# Patient Record
Sex: MALE | Race: OTHER | HISPANIC OR LATINO | Employment: FULL TIME | URBAN - METROPOLITAN AREA
[De-identification: names, ages, dates, MRNs, and addresses within clinical notes are randomized per-mention and may not be internally consistent; named-entity substitution may affect disease eponyms.]

---

## 2023-05-05 NOTE — PROGRESS NOTES
Subjective:       Patient ID: Javier Egan is a 37 y.o. male.    Chief Complaint: Establish Care      HPI   This is a 37-year-old  male who presents to clinic today to establish care.  Patient has no significant past medical history and takes no daily medications.  Patient works in the oil field industry, he is a .  He has no complaints today.  Review of Systems  Comprehensive review of systems negative except as stated in HPI    The patient's Health Maintenance was reviewed and the following appears to be due:   Health Maintenance Due   Topic Date Due    Hepatitis C Screening  Never done    Lipid Panel  Never done    HIV Screening  Never done    TETANUS VACCINE  Never done    Hemoglobin A1c (Diabetic Prevention Screening)  Never done       Past Medical History:  History reviewed. No pertinent past medical history.  History reviewed. No pertinent surgical history.  Review of patient's allergies indicates:  No Known Allergies  Current Outpatient Medications on File Prior to Visit   Medication Sig Dispense Refill    ascorbic acid, vitamin C, (VITAMIN C) 1000 MG tablet Take 1,000 mg by mouth once daily.       No current facility-administered medications on file prior to visit.     Social History     Socioeconomic History    Marital status: Single   Tobacco Use    Smoking status: Never     Passive exposure: Never    Smokeless tobacco: Never   Substance and Sexual Activity    Alcohol use: Yes     Comment: socially    Drug use: Never    Sexual activity: Not Currently     Social Determinants of Health     Financial Resource Strain: Low Risk     Difficulty of Paying Living Expenses: Not hard at all   Food Insecurity: Unknown    Worried About Running Out of Food in the Last Year: Patient refused    Ran Out of Food in the Last Year: Patient refused   Transportation Needs: Unknown    Lack of Transportation (Medical): Patient refused    Lack of Transportation (Non-Medical): Patient refused   Physical  "Activity: Inactive    Days of Exercise per Week: 0 days    Minutes of Exercise per Session: 0 min   Stress: Unknown    Feeling of Stress : Patient refused   Social Connections: Unknown    Frequency of Communication with Friends and Family: Patient refused    Frequency of Social Gatherings with Friends and Family: Patient refused    Attends Taoist Services: Patient refused    Active Member of Clubs or Organizations: No    Attends Club or Organization Meetings: Never    Marital Status: Never    Housing Stability: Low Risk     Unable to Pay for Housing in the Last Year: No    Number of Places Lived in the Last Year: 1    Unstable Housing in the Last Year: No     Family History   Family history unknown: Yes       Objective:       /72 (BP Location: Right arm)   Pulse 94   Temp 98.1 °F (36.7 °C) (Oral)   Resp 16   Ht 5' 8" (1.727 m)   Wt 102.2 kg (225 lb 3.2 oz)   SpO2 97%   BMI 34.24 kg/m²      Physical Exam  Vitals and nursing note reviewed.   Constitutional:       Appearance: Normal appearance. He is obese.   HENT:      Head: Normocephalic and atraumatic.      Right Ear: Tympanic membrane, ear canal and external ear normal.      Left Ear: Tympanic membrane, ear canal and external ear normal.      Nose: Nose normal.      Mouth/Throat:      Mouth: Mucous membranes are moist.      Pharynx: Oropharynx is clear.   Eyes:      Extraocular Movements: Extraocular movements intact.      Conjunctiva/sclera: Conjunctivae normal.      Pupils: Pupils are equal, round, and reactive to light.   Cardiovascular:      Rate and Rhythm: Normal rate and regular rhythm.      Pulses: Normal pulses.      Heart sounds: Normal heart sounds.   Pulmonary:      Effort: Pulmonary effort is normal.      Breath sounds: Normal breath sounds.   Musculoskeletal:         General: Normal range of motion.      Cervical back: Normal range of motion and neck supple.   Skin:     General: Skin is warm and dry.   Neurological:      " General: No focal deficit present.      Mental Status: He is alert and oriented to person, place, and time.   Psychiatric:         Mood and Affect: Mood normal.         Behavior: Behavior normal.         Thought Content: Thought content normal.         Judgment: Judgment normal.       Labs  No results found for any previous visit.       Assessment and Plan       ICD-10-CM ICD-9-CM   1. Encounter to establish care  Z76.89 V65.8   2. Annual physical exam  Z00.00 V70.0   3. Need for hepatitis C screening test  Z11.59 V73.89   4. Screening for HIV (human immunodeficiency virus)  Z11.4 V73.89        1. Encounter to establish care    2. Annual physical exam  -     CBC Auto Differential; Future; Expected date: 05/08/2023  -     Comprehensive Metabolic Panel; Future; Expected date: 05/08/2023  -     Lipid Panel; Future; Expected date: 05/08/2023  -     TSH; Future; Expected date: 05/08/2023  -     Hemoglobin A1C; Future; Expected date: 05/08/2023    3. Need for hepatitis C screening test  -     Hepatitis C Antibody; Future; Expected date: 05/08/2023    4. Screening for HIV (human immunodeficiency virus)  -     HIV 1/2 Ag/Ab (4th Gen); Future; Expected date: 05/08/2023       Patient will return to clinic in 2 weeks with labs prior for wellness exam.  Discussed administering Boostrix at that exam.  Patient will think about it.    Follow up in about 2 weeks (around 5/22/2023) for Annual.

## 2023-05-08 ENCOUNTER — OFFICE VISIT (OUTPATIENT)
Dept: FAMILY MEDICINE | Facility: CLINIC | Age: 38
End: 2023-05-08
Payer: COMMERCIAL

## 2023-05-08 VITALS
SYSTOLIC BLOOD PRESSURE: 118 MMHG | BODY MASS INDEX: 34.13 KG/M2 | WEIGHT: 225.19 LBS | HEART RATE: 94 BPM | RESPIRATION RATE: 16 BRPM | OXYGEN SATURATION: 97 % | TEMPERATURE: 98 F | DIASTOLIC BLOOD PRESSURE: 72 MMHG | HEIGHT: 68 IN

## 2023-05-08 DIAGNOSIS — Z76.89 ENCOUNTER TO ESTABLISH CARE: Primary | ICD-10-CM

## 2023-05-08 DIAGNOSIS — Z00.00 ANNUAL PHYSICAL EXAM: ICD-10-CM

## 2023-05-08 DIAGNOSIS — Z11.4 SCREENING FOR HIV (HUMAN IMMUNODEFICIENCY VIRUS): ICD-10-CM

## 2023-05-08 DIAGNOSIS — Z11.59 NEED FOR HEPATITIS C SCREENING TEST: ICD-10-CM

## 2023-05-08 PROCEDURE — 3008F BODY MASS INDEX DOCD: CPT | Mod: CPTII,,, | Performed by: NURSE PRACTITIONER

## 2023-05-08 PROCEDURE — 3078F PR MOST RECENT DIASTOLIC BLOOD PRESSURE < 80 MM HG: ICD-10-PCS | Mod: CPTII,,, | Performed by: NURSE PRACTITIONER

## 2023-05-08 PROCEDURE — 1160F PR REVIEW ALL MEDS BY PRESCRIBER/CLIN PHARMACIST DOCUMENTED: ICD-10-PCS | Mod: CPTII,,, | Performed by: NURSE PRACTITIONER

## 2023-05-08 PROCEDURE — 99395 PREV VISIT EST AGE 18-39: CPT | Mod: ,,, | Performed by: NURSE PRACTITIONER

## 2023-05-08 PROCEDURE — 3078F DIAST BP <80 MM HG: CPT | Mod: CPTII,,, | Performed by: NURSE PRACTITIONER

## 2023-05-08 PROCEDURE — 99395 PR PREVENTIVE VISIT,EST,18-39: ICD-10-PCS | Mod: ,,, | Performed by: NURSE PRACTITIONER

## 2023-05-08 PROCEDURE — 1160F RVW MEDS BY RX/DR IN RCRD: CPT | Mod: CPTII,,, | Performed by: NURSE PRACTITIONER

## 2023-05-08 PROCEDURE — 3074F SYST BP LT 130 MM HG: CPT | Mod: CPTII,,, | Performed by: NURSE PRACTITIONER

## 2023-05-08 PROCEDURE — 1159F PR MEDICATION LIST DOCUMENTED IN MEDICAL RECORD: ICD-10-PCS | Mod: CPTII,,, | Performed by: NURSE PRACTITIONER

## 2023-05-08 PROCEDURE — 1159F MED LIST DOCD IN RCRD: CPT | Mod: CPTII,,, | Performed by: NURSE PRACTITIONER

## 2023-05-08 PROCEDURE — 3008F PR BODY MASS INDEX (BMI) DOCUMENTED: ICD-10-PCS | Mod: CPTII,,, | Performed by: NURSE PRACTITIONER

## 2023-05-08 PROCEDURE — 3074F PR MOST RECENT SYSTOLIC BLOOD PRESSURE < 130 MM HG: ICD-10-PCS | Mod: CPTII,,, | Performed by: NURSE PRACTITIONER

## 2023-05-08 RX ORDER — IBUPROFEN 100 MG/5ML
1000 SUSPENSION, ORAL (FINAL DOSE FORM) ORAL DAILY
COMMUNITY

## 2023-05-17 ENCOUNTER — TELEPHONE (OUTPATIENT)
Dept: FAMILY MEDICINE | Facility: CLINIC | Age: 38
End: 2023-05-17
Payer: COMMERCIAL

## 2023-05-17 NOTE — TELEPHONE ENCOUNTER
No answer/ no voicemail on 5/17/23 at 3:20 when attempted to contact patient for visit reminder. Labs needed prior to visit.

## 2023-05-20 ENCOUNTER — LAB VISIT (OUTPATIENT)
Dept: LAB | Facility: HOSPITAL | Age: 38
End: 2023-05-20
Attending: NURSE PRACTITIONER
Payer: COMMERCIAL

## 2023-05-20 DIAGNOSIS — Z11.4 SCREENING FOR HIV (HUMAN IMMUNODEFICIENCY VIRUS): ICD-10-CM

## 2023-05-20 DIAGNOSIS — Z00.00 ANNUAL PHYSICAL EXAM: ICD-10-CM

## 2023-05-20 DIAGNOSIS — Z11.59 NEED FOR HEPATITIS C SCREENING TEST: ICD-10-CM

## 2023-05-20 LAB
ALBUMIN SERPL-MCNC: 4.3 G/DL (ref 3.5–5)
ALBUMIN/GLOB SERPL: 1.3 RATIO (ref 1.1–2)
ALP SERPL-CCNC: 109 UNIT/L (ref 40–150)
ALT SERPL-CCNC: 36 UNIT/L (ref 0–55)
AST SERPL-CCNC: 21 UNIT/L (ref 5–34)
BASOPHILS # BLD AUTO: 0.02 X10(3)/MCL
BASOPHILS NFR BLD AUTO: 0.4 %
BILIRUBIN DIRECT+TOT PNL SERPL-MCNC: 0.6 MG/DL
BUN SERPL-MCNC: 14 MG/DL (ref 8.9–20.6)
CALCIUM SERPL-MCNC: 8.8 MG/DL (ref 8.4–10.2)
CHLORIDE SERPL-SCNC: 107 MMOL/L (ref 98–107)
CHOLEST SERPL-MCNC: 146 MG/DL
CHOLEST/HDLC SERPL: 7 {RATIO} (ref 0–5)
CO2 SERPL-SCNC: 28 MMOL/L (ref 22–29)
CREAT SERPL-MCNC: 0.74 MG/DL (ref 0.73–1.18)
EOSINOPHIL # BLD AUTO: 0.11 X10(3)/MCL (ref 0–0.9)
EOSINOPHIL NFR BLD AUTO: 2.4 %
ERYTHROCYTE [DISTWIDTH] IN BLOOD BY AUTOMATED COUNT: 14 % (ref 11.5–17)
EST. AVERAGE GLUCOSE BLD GHB EST-MCNC: 105.4 MG/DL
GFR SERPLBLD CREATININE-BSD FMLA CKD-EPI: >60 MLS/MIN/1.73/M2
GLOBULIN SER-MCNC: 3.4 GM/DL (ref 2.4–3.5)
GLUCOSE SERPL-MCNC: 94 MG/DL (ref 74–100)
HBA1C MFR BLD: 5.3 %
HCT VFR BLD AUTO: 50.7 % (ref 42–52)
HCV AB SERPL QL IA: NONREACTIVE
HDLC SERPL-MCNC: 22 MG/DL (ref 35–60)
HGB BLD-MCNC: 15.8 G/DL (ref 14–18)
HIV 1+2 AB+HIV1 P24 AG SERPL QL IA: NONREACTIVE
IMM GRANULOCYTES # BLD AUTO: 0.01 X10(3)/MCL (ref 0–0.04)
IMM GRANULOCYTES NFR BLD AUTO: 0.2 %
LDLC SERPL CALC-MCNC: 58 MG/DL (ref 50–140)
LYMPHOCYTES # BLD AUTO: 1.96 X10(3)/MCL (ref 0.6–4.6)
LYMPHOCYTES NFR BLD AUTO: 42.6 %
MCH RBC QN AUTO: 26.3 PG (ref 27–31)
MCHC RBC AUTO-ENTMCNC: 31.2 G/DL (ref 33–36)
MCV RBC AUTO: 84.5 FL (ref 80–94)
MONOCYTES # BLD AUTO: 0.36 X10(3)/MCL (ref 0.1–1.3)
MONOCYTES NFR BLD AUTO: 7.8 %
NEUTROPHILS # BLD AUTO: 2.14 X10(3)/MCL (ref 2.1–9.2)
NEUTROPHILS NFR BLD AUTO: 46.6 %
PLATELET # BLD AUTO: 150 X10(3)/MCL (ref 130–400)
PMV BLD AUTO: 11.3 FL (ref 7.4–10.4)
POTASSIUM SERPL-SCNC: 3.9 MMOL/L (ref 3.5–5.1)
PROT SERPL-MCNC: 7.7 GM/DL (ref 6.4–8.3)
RBC # BLD AUTO: 6 X10(6)/MCL (ref 4.7–6.1)
SODIUM SERPL-SCNC: 144 MMOL/L (ref 136–145)
TRIGL SERPL-MCNC: 330 MG/DL (ref 34–140)
TSH SERPL-ACNC: 2.76 UIU/ML (ref 0.35–4.94)
VLDLC SERPL CALC-MCNC: 66 MG/DL
WBC # SPEC AUTO: 4.6 X10(3)/MCL (ref 4.5–11.5)

## 2023-05-20 PROCEDURE — 80061 LIPID PANEL: CPT

## 2023-05-20 PROCEDURE — 84443 ASSAY THYROID STIM HORMONE: CPT

## 2023-05-20 PROCEDURE — 36415 COLL VENOUS BLD VENIPUNCTURE: CPT

## 2023-05-20 PROCEDURE — 80053 COMPREHEN METABOLIC PANEL: CPT

## 2023-05-20 PROCEDURE — 83036 HEMOGLOBIN GLYCOSYLATED A1C: CPT

## 2023-05-20 PROCEDURE — 85025 COMPLETE CBC W/AUTO DIFF WBC: CPT

## 2023-05-20 PROCEDURE — 87389 HIV-1 AG W/HIV-1&-2 AB AG IA: CPT

## 2023-05-20 PROCEDURE — 86803 HEPATITIS C AB TEST: CPT

## 2023-05-24 ENCOUNTER — OFFICE VISIT (OUTPATIENT)
Dept: FAMILY MEDICINE | Facility: CLINIC | Age: 38
End: 2023-05-24
Payer: COMMERCIAL

## 2023-05-24 VITALS
OXYGEN SATURATION: 96 % | RESPIRATION RATE: 16 BRPM | SYSTOLIC BLOOD PRESSURE: 136 MMHG | BODY MASS INDEX: 34.5 KG/M2 | HEART RATE: 109 BPM | DIASTOLIC BLOOD PRESSURE: 84 MMHG | WEIGHT: 227.63 LBS | HEIGHT: 68 IN | TEMPERATURE: 99 F

## 2023-05-24 DIAGNOSIS — Z11.4 SCREENING FOR HIV (HUMAN IMMUNODEFICIENCY VIRUS): ICD-10-CM

## 2023-05-24 DIAGNOSIS — Z23 NEED FOR TDAP VACCINATION: ICD-10-CM

## 2023-05-24 DIAGNOSIS — Z23 NEED FOR INFLUENZA VACCINATION: ICD-10-CM

## 2023-05-24 DIAGNOSIS — Z11.59 NEED FOR HEPATITIS C SCREENING TEST: ICD-10-CM

## 2023-05-24 DIAGNOSIS — E78.1 HYPERTRIGLYCERIDEMIA: ICD-10-CM

## 2023-05-24 DIAGNOSIS — Z00.00 ANNUAL PHYSICAL EXAM: Primary | ICD-10-CM

## 2023-05-24 PROCEDURE — 1159F MED LIST DOCD IN RCRD: CPT | Mod: CPTII,,, | Performed by: NURSE PRACTITIONER

## 2023-05-24 PROCEDURE — 3079F PR MOST RECENT DIASTOLIC BLOOD PRESSURE 80-89 MM HG: ICD-10-PCS | Mod: CPTII,,, | Performed by: NURSE PRACTITIONER

## 2023-05-24 PROCEDURE — 90472 TDAP VACCINE GREATER THAN OR EQUAL TO 7YO IM: ICD-10-PCS | Mod: ,,, | Performed by: NURSE PRACTITIONER

## 2023-05-24 PROCEDURE — 1159F PR MEDICATION LIST DOCUMENTED IN MEDICAL RECORD: ICD-10-PCS | Mod: CPTII,,, | Performed by: NURSE PRACTITIONER

## 2023-05-24 PROCEDURE — 99395 PR PREVENTIVE VISIT,EST,18-39: ICD-10-PCS | Mod: 25,,, | Performed by: NURSE PRACTITIONER

## 2023-05-24 PROCEDURE — 3075F PR MOST RECENT SYSTOLIC BLOOD PRESS GE 130-139MM HG: ICD-10-PCS | Mod: CPTII,,, | Performed by: NURSE PRACTITIONER

## 2023-05-24 PROCEDURE — 3008F PR BODY MASS INDEX (BMI) DOCUMENTED: ICD-10-PCS | Mod: CPTII,,, | Performed by: NURSE PRACTITIONER

## 2023-05-24 PROCEDURE — 90471 IMMUNIZATION ADMIN: CPT | Mod: ,,, | Performed by: NURSE PRACTITIONER

## 2023-05-24 PROCEDURE — 90686 IIV4 VACC NO PRSV 0.5 ML IM: CPT | Mod: ,,, | Performed by: NURSE PRACTITIONER

## 2023-05-24 PROCEDURE — 1160F RVW MEDS BY RX/DR IN RCRD: CPT | Mod: CPTII,,, | Performed by: NURSE PRACTITIONER

## 2023-05-24 PROCEDURE — 3008F BODY MASS INDEX DOCD: CPT | Mod: CPTII,,, | Performed by: NURSE PRACTITIONER

## 2023-05-24 PROCEDURE — 90472 IMMUNIZATION ADMIN EACH ADD: CPT | Mod: ,,, | Performed by: NURSE PRACTITIONER

## 2023-05-24 PROCEDURE — 90471 FLU VACCINE (QUAD) GREATER THAN OR EQUAL TO 3YO PRESERVATIVE FREE IM: ICD-10-PCS | Mod: ,,, | Performed by: NURSE PRACTITIONER

## 2023-05-24 PROCEDURE — 99395 PREV VISIT EST AGE 18-39: CPT | Mod: 25,,, | Performed by: NURSE PRACTITIONER

## 2023-05-24 PROCEDURE — 90715 TDAP VACCINE GREATER THAN OR EQUAL TO 7YO IM: ICD-10-PCS | Mod: ,,, | Performed by: NURSE PRACTITIONER

## 2023-05-24 PROCEDURE — 3079F DIAST BP 80-89 MM HG: CPT | Mod: CPTII,,, | Performed by: NURSE PRACTITIONER

## 2023-05-24 PROCEDURE — 90715 TDAP VACCINE 7 YRS/> IM: CPT | Mod: ,,, | Performed by: NURSE PRACTITIONER

## 2023-05-24 PROCEDURE — 3075F SYST BP GE 130 - 139MM HG: CPT | Mod: CPTII,,, | Performed by: NURSE PRACTITIONER

## 2023-05-24 PROCEDURE — 90686 FLU VACCINE (QUAD) GREATER THAN OR EQUAL TO 3YO PRESERVATIVE FREE IM: ICD-10-PCS | Mod: ,,, | Performed by: NURSE PRACTITIONER

## 2023-05-24 PROCEDURE — 1160F PR REVIEW ALL MEDS BY PRESCRIBER/CLIN PHARMACIST DOCUMENTED: ICD-10-PCS | Mod: CPTII,,, | Performed by: NURSE PRACTITIONER

## 2023-05-24 NOTE — ASSESSMENT & PLAN NOTE
Total cholesterol 146, HDL 22, triglycerides 330, LDL 58.  Total cholesterol to HDL ratio 7.  Discussed with patient elevated triglyceride levels and low HDL level.  Discussed need for low-cholesterol diet, increased exercise.  Will recheck in 1 year.

## 2023-05-24 NOTE — PROGRESS NOTES
Subjective:       Patient ID: Javier Egan is a 37 y.o. male.    Chief Complaint: Annual Exam      HPI   This is a 37-year-old  male who presents to clinic today for an annual wellness exam.  Patient has no significant past medical history and takes no daily medications.  He has no complaints today.  Review of Systems  Comprehensive review of systems negative except as stated in HPI    The patient's Health Maintenance was reviewed and the following appears to be due:   Health Maintenance Due   Topic Date Due    TETANUS VACCINE  Never done       Past Medical History:  History reviewed. No pertinent past medical history.  History reviewed. No pertinent surgical history.  Review of patient's allergies indicates:  No Known Allergies  Current Outpatient Medications on File Prior to Visit   Medication Sig Dispense Refill    ascorbic acid, vitamin C, (VITAMIN C) 1000 MG tablet Take 1,000 mg by mouth once daily.       No current facility-administered medications on file prior to visit.     Social History     Socioeconomic History    Marital status: Single   Tobacco Use    Smoking status: Never     Passive exposure: Never    Smokeless tobacco: Never   Substance and Sexual Activity    Alcohol use: Yes     Comment: socially    Drug use: Never    Sexual activity: Not Currently     Social Determinants of Health     Financial Resource Strain: Low Risk     Difficulty of Paying Living Expenses: Not hard at all   Food Insecurity: Unknown    Worried About Running Out of Food in the Last Year: Patient refused    Ran Out of Food in the Last Year: Patient refused   Transportation Needs: Unknown    Lack of Transportation (Medical): Patient refused    Lack of Transportation (Non-Medical): Patient refused   Physical Activity: Inactive    Days of Exercise per Week: 0 days    Minutes of Exercise per Session: 0 min   Stress: Unknown    Feeling of Stress : Patient refused   Social Connections: Unknown    Frequency of Communication  "with Friends and Family: Patient refused    Frequency of Social Gatherings with Friends and Family: Patient refused    Attends Yazidi Services: Patient refused    Active Member of Clubs or Organizations: No    Attends Club or Organization Meetings: Never    Marital Status: Never    Housing Stability: Low Risk     Unable to Pay for Housing in the Last Year: No    Number of Places Lived in the Last Year: 1    Unstable Housing in the Last Year: No     Family History   Family history unknown: Yes       Objective:       /84 (BP Location: Left arm)   Pulse 109   Temp 98.7 °F (37.1 °C) (Oral)   Resp 16   Ht 5' 8" (1.727 m)   Wt 103.2 kg (227 lb 9.6 oz)   SpO2 96%   BMI 34.61 kg/m²      Physical Exam  Vitals and nursing note reviewed.   Constitutional:       Appearance: Normal appearance. He is normal weight.   HENT:      Head: Normocephalic and atraumatic.      Right Ear: Tympanic membrane, ear canal and external ear normal.      Left Ear: Tympanic membrane, ear canal and external ear normal.      Nose: Nose normal.      Mouth/Throat:      Mouth: Mucous membranes are moist.      Pharynx: Oropharynx is clear.   Eyes:      Extraocular Movements: Extraocular movements intact.      Conjunctiva/sclera: Conjunctivae normal.      Pupils: Pupils are equal, round, and reactive to light.   Cardiovascular:      Rate and Rhythm: Normal rate and regular rhythm.      Pulses: Normal pulses.      Heart sounds: Normal heart sounds.   Pulmonary:      Effort: Pulmonary effort is normal.      Breath sounds: Normal breath sounds.   Abdominal:      General: Abdomen is flat. Bowel sounds are normal.      Palpations: Abdomen is soft.   Musculoskeletal:         General: Normal range of motion.      Cervical back: Normal range of motion and neck supple.   Skin:     General: Skin is warm and dry.   Neurological:      General: No focal deficit present.      Mental Status: He is alert and oriented to person, place, and time. "   Psychiatric:         Mood and Affect: Mood normal.         Behavior: Behavior normal.         Thought Content: Thought content normal.         Judgment: Judgment normal.       Labs  Lab Visit on 05/20/2023   Component Date Value Ref Range Status    Sodium Level 05/20/2023 144  136 - 145 mmol/L Final    Potassium Level 05/20/2023 3.9  3.5 - 5.1 mmol/L Final    Chloride 05/20/2023 107  98 - 107 mmol/L Final    Carbon Dioxide 05/20/2023 28  22 - 29 mmol/L Final    Glucose Level 05/20/2023 94  74 - 100 mg/dL Final    Blood Urea Nitrogen 05/20/2023 14.0  8.9 - 20.6 mg/dL Final    Creatinine 05/20/2023 0.74  0.73 - 1.18 mg/dL Final    Calcium Level Total 05/20/2023 8.8  8.4 - 10.2 mg/dL Final    Protein Total 05/20/2023 7.7  6.4 - 8.3 gm/dL Final    Albumin Level 05/20/2023 4.3  3.5 - 5.0 g/dL Final    Globulin 05/20/2023 3.4  2.4 - 3.5 gm/dL Final    Albumin/Globulin Ratio 05/20/2023 1.3  1.1 - 2.0 ratio Final    Bilirubin Total 05/20/2023 0.6  <=1.5 mg/dL Final    Alkaline Phosphatase 05/20/2023 109  40 - 150 unit/L Final    Alanine Aminotransferase 05/20/2023 36  0 - 55 unit/L Final    Aspartate Aminotransferase 05/20/2023 21  5 - 34 unit/L Final    eGFR 05/20/2023 >60  mls/min/1.73/m2 Final    Cholesterol Total 05/20/2023 146  <=200 mg/dL Final    HDL Cholesterol 05/20/2023 22 (L)  35 - 60 mg/dL Final    Triglyceride 05/20/2023 330 (H)  34 - 140 mg/dL Final    Cholesterol/HDL Ratio 05/20/2023 7 (H)  0 - 5 Final    Very Low Density Lipoprotein 05/20/2023 66   Final    LDL Cholesterol 05/20/2023 58.00  50.00 - 140.00 mg/dL Final    Thyroid Stimulating Hormone 05/20/2023 2.761  0.350 - 4.940 uIU/mL Final    Hemoglobin A1c 05/20/2023 5.3  <=7.0 % Final    Estimated Average Glucose 05/20/2023 105.4  mg/dL Final    Hepatitis C Antibody 05/20/2023 Nonreactive  Nonreactive Final    HIV 05/20/2023 Nonreactive  Nonreactive Final    WBC 05/20/2023 4.60  4.50 - 11.50 x10(3)/mcL Final    RBC 05/20/2023 6.00  4.70 - 6.10  x10(6)/mcL Final    Hgb 05/20/2023 15.8  14.0 - 18.0 g/dL Final    Hct 05/20/2023 50.7  42.0 - 52.0 % Final    MCV 05/20/2023 84.5  80.0 - 94.0 fL Final    MCH 05/20/2023 26.3 (L)  27.0 - 31.0 pg Final    MCHC 05/20/2023 31.2 (L)  33.0 - 36.0 g/dL Final    RDW 05/20/2023 14.0  11.5 - 17.0 % Final    Platelet 05/20/2023 150  130 - 400 x10(3)/mcL Final    MPV 05/20/2023 11.3 (H)  7.4 - 10.4 fL Final    Neut % 05/20/2023 46.6  % Final    Lymph % 05/20/2023 42.6  % Final    Mono % 05/20/2023 7.8  % Final    Eos % 05/20/2023 2.4  % Final    Basophil % 05/20/2023 0.4  % Final    Lymph # 05/20/2023 1.96  0.6 - 4.6 x10(3)/mcL Final    Neut # 05/20/2023 2.14  2.1 - 9.2 x10(3)/mcL Final    Mono # 05/20/2023 0.36  0.1 - 1.3 x10(3)/mcL Final    Eos # 05/20/2023 0.11  0 - 0.9 x10(3)/mcL Final    Baso # 05/20/2023 0.02  <=0.2 x10(3)/mcL Final    IG# 05/20/2023 0.01  0 - 0.04 x10(3)/mcL Final    IG% 05/20/2023 0.2  % Final       Assessment and Plan       ICD-10-CM ICD-9-CM   1. Annual physical exam  Z00.00 V70.0   2. Hypertriglyceridemia  E78.1 272.1   3. Screening for HIV (human immunodeficiency virus)  Z11.4 V73.89   4. Need for hepatitis C screening test  Z11.59 V73.89   5. Need for Tdap vaccination  Z23 V06.1   6. Need for influenza vaccination  Z23 V04.81        1. Annual physical exam  Overview:  Annual exam yearly in May    Orders:  -     CBC Auto Differential; Future; Expected date: 05/24/2024  -     Comprehensive Metabolic Panel; Future; Expected date: 05/24/2024  -     Lipid Panel; Future; Expected date: 05/24/2024  -     TSH; Future; Expected date: 05/24/2024  -     Hemoglobin A1C; Future; Expected date: 05/24/2024    2. Hypertriglyceridemia  Assessment & Plan:  Total cholesterol 146, HDL 22, triglycerides 330, LDL 58.  Total cholesterol to HDL ratio 7.  Discussed with patient elevated triglyceride levels and low HDL level.  Discussed need for low-cholesterol diet, increased exercise.  Will recheck in 1 year.      3.  Screening for HIV (human immunodeficiency virus)  Comments:  Nonreactive    4. Need for hepatitis C screening test  Comments:  Nonreactive    5. Need for Tdap vaccination  -     (In Office Administered) Tdap Vaccine    6. Need for influenza vaccination  -     Influenza - Quadrivalent *Preferred* (6 months+) (PF)           Follow up in about 1 year (around 5/24/2024) for Annual.

## 2023-08-28 PROBLEM — Z00.00 ANNUAL PHYSICAL EXAM: Status: RESOLVED | Noted: 2023-05-08 | Resolved: 2023-08-28

## 2024-05-21 ENCOUNTER — TELEPHONE (OUTPATIENT)
Dept: FAMILY MEDICINE | Facility: CLINIC | Age: 39
End: 2024-05-21
Payer: COMMERCIAL

## 2024-05-21 NOTE — TELEPHONE ENCOUNTER
Attempted to contact patient for previsit on 5/21/24 at 9:35. Left message reminding patient about his upcoming visit with labs needed prior to appointment.

## 2024-05-25 ENCOUNTER — LAB VISIT (OUTPATIENT)
Dept: LAB | Facility: HOSPITAL | Age: 39
End: 2024-05-25
Attending: NURSE PRACTITIONER
Payer: COMMERCIAL

## 2024-05-25 DIAGNOSIS — Z00.00 ANNUAL PHYSICAL EXAM: ICD-10-CM

## 2024-05-25 LAB
ALBUMIN SERPL-MCNC: 4.1 G/DL (ref 3.5–5)
ALBUMIN/GLOB SERPL: 1.2 RATIO (ref 1.1–2)
ALP SERPL-CCNC: 117 UNIT/L (ref 40–150)
ALT SERPL-CCNC: 25 UNIT/L (ref 0–55)
ANION GAP SERPL CALC-SCNC: 8 MEQ/L
AST SERPL-CCNC: 21 UNIT/L (ref 5–34)
BASOPHILS # BLD AUTO: 0.03 X10(3)/MCL
BASOPHILS NFR BLD AUTO: 0.6 %
BILIRUB SERPL-MCNC: 0.5 MG/DL
BUN SERPL-MCNC: 11 MG/DL (ref 8.9–20.6)
CALCIUM SERPL-MCNC: 8.8 MG/DL (ref 8.4–10.2)
CHLORIDE SERPL-SCNC: 111 MMOL/L (ref 98–107)
CHOLEST SERPL-MCNC: 193 MG/DL
CHOLEST/HDLC SERPL: 10 {RATIO} (ref 0–5)
CO2 SERPL-SCNC: 25 MMOL/L (ref 22–29)
CREAT SERPL-MCNC: 0.85 MG/DL (ref 0.73–1.18)
CREAT/UREA NIT SERPL: 13
EOSINOPHIL # BLD AUTO: 0.14 X10(3)/MCL (ref 0–0.9)
EOSINOPHIL NFR BLD AUTO: 2.6 %
ERYTHROCYTE [DISTWIDTH] IN BLOOD BY AUTOMATED COUNT: 14.2 % (ref 11.5–17)
EST. AVERAGE GLUCOSE BLD GHB EST-MCNC: 108.3 MG/DL
GFR SERPLBLD CREATININE-BSD FMLA CKD-EPI: >60 ML/MIN/1.73/M2
GLOBULIN SER-MCNC: 3.4 GM/DL (ref 2.4–3.5)
GLUCOSE SERPL-MCNC: 101 MG/DL (ref 74–100)
HBA1C MFR BLD: 5.4 %
HCT VFR BLD AUTO: 50.3 % (ref 42–52)
HDLC SERPL-MCNC: 20 MG/DL (ref 35–60)
HGB BLD-MCNC: 16 G/DL (ref 14–18)
IMM GRANULOCYTES # BLD AUTO: 0.01 X10(3)/MCL (ref 0–0.04)
IMM GRANULOCYTES NFR BLD AUTO: 0.2 %
LYMPHOCYTES # BLD AUTO: 2.37 X10(3)/MCL (ref 0.6–4.6)
LYMPHOCYTES NFR BLD AUTO: 43.9 %
MCH RBC QN AUTO: 26.5 PG (ref 27–31)
MCHC RBC AUTO-ENTMCNC: 31.8 G/DL (ref 33–36)
MCV RBC AUTO: 83.4 FL (ref 80–94)
MONOCYTES # BLD AUTO: 0.44 X10(3)/MCL (ref 0.1–1.3)
MONOCYTES NFR BLD AUTO: 8.1 %
NEUTROPHILS # BLD AUTO: 2.41 X10(3)/MCL (ref 2.1–9.2)
NEUTROPHILS NFR BLD AUTO: 44.6 %
PLATELET # BLD AUTO: 127 X10(3)/MCL (ref 130–400)
PMV BLD AUTO: 11.4 FL (ref 7.4–10.4)
POTASSIUM SERPL-SCNC: 3.8 MMOL/L (ref 3.5–5.1)
PROT SERPL-MCNC: 7.5 GM/DL (ref 6.4–8.3)
RBC # BLD AUTO: 6.03 X10(6)/MCL (ref 4.7–6.1)
SODIUM SERPL-SCNC: 144 MMOL/L (ref 136–145)
TRIGL SERPL-MCNC: 572 MG/DL (ref 34–140)
TSH SERPL-ACNC: 2.75 UIU/ML (ref 0.35–4.94)
WBC # SPEC AUTO: 5.4 X10(3)/MCL (ref 4.5–11.5)

## 2024-05-25 PROCEDURE — 80061 LIPID PANEL: CPT

## 2024-05-25 PROCEDURE — 80053 COMPREHEN METABOLIC PANEL: CPT

## 2024-05-25 PROCEDURE — 85025 COMPLETE CBC W/AUTO DIFF WBC: CPT

## 2024-05-25 PROCEDURE — 36415 COLL VENOUS BLD VENIPUNCTURE: CPT

## 2024-05-25 PROCEDURE — 84443 ASSAY THYROID STIM HORMONE: CPT

## 2024-05-25 PROCEDURE — 83036 HEMOGLOBIN GLYCOSYLATED A1C: CPT

## 2024-05-28 ENCOUNTER — OFFICE VISIT (OUTPATIENT)
Dept: FAMILY MEDICINE | Facility: CLINIC | Age: 39
End: 2024-05-28
Payer: COMMERCIAL

## 2024-05-28 VITALS
DIASTOLIC BLOOD PRESSURE: 78 MMHG | TEMPERATURE: 98 F | WEIGHT: 230 LBS | SYSTOLIC BLOOD PRESSURE: 130 MMHG | BODY MASS INDEX: 34.86 KG/M2 | HEART RATE: 97 BPM | RESPIRATION RATE: 16 BRPM | OXYGEN SATURATION: 98 % | HEIGHT: 68 IN

## 2024-05-28 DIAGNOSIS — Z00.00 ANNUAL PHYSICAL EXAM: Primary | ICD-10-CM

## 2024-05-28 DIAGNOSIS — E78.5 DYSLIPIDEMIA: ICD-10-CM

## 2024-05-28 DIAGNOSIS — E66.09 CLASS 1 OBESITY DUE TO EXCESS CALORIES WITH SERIOUS COMORBIDITY AND BODY MASS INDEX (BMI) OF 34.0 TO 34.9 IN ADULT: ICD-10-CM

## 2024-05-28 DIAGNOSIS — D69.6 THROMBOCYTOPENIA: ICD-10-CM

## 2024-05-28 PROBLEM — E66.811 CLASS 1 OBESITY DUE TO EXCESS CALORIES WITH SERIOUS COMORBIDITY AND BODY MASS INDEX (BMI) OF 34.0 TO 34.9 IN ADULT: Status: ACTIVE | Noted: 2024-05-28

## 2024-05-28 PROCEDURE — 1160F RVW MEDS BY RX/DR IN RCRD: CPT | Mod: CPTII,,, | Performed by: NURSE PRACTITIONER

## 2024-05-28 PROCEDURE — 3044F HG A1C LEVEL LT 7.0%: CPT | Mod: CPTII,,, | Performed by: NURSE PRACTITIONER

## 2024-05-28 PROCEDURE — 3078F DIAST BP <80 MM HG: CPT | Mod: CPTII,,, | Performed by: NURSE PRACTITIONER

## 2024-05-28 PROCEDURE — 99395 PREV VISIT EST AGE 18-39: CPT | Mod: ,,, | Performed by: NURSE PRACTITIONER

## 2024-05-28 PROCEDURE — 3008F BODY MASS INDEX DOCD: CPT | Mod: CPTII,,, | Performed by: NURSE PRACTITIONER

## 2024-05-28 PROCEDURE — 3075F SYST BP GE 130 - 139MM HG: CPT | Mod: CPTII,,, | Performed by: NURSE PRACTITIONER

## 2024-05-28 PROCEDURE — 1159F MED LIST DOCD IN RCRD: CPT | Mod: CPTII,,, | Performed by: NURSE PRACTITIONER

## 2024-05-28 RX ORDER — FENOFIBRATE 145 MG/1
145 TABLET, FILM COATED ORAL DAILY
Qty: 90 TABLET | Refills: 3 | Status: SHIPPED | OUTPATIENT
Start: 2024-05-28 | End: 2025-05-28

## 2024-05-28 NOTE — ASSESSMENT & PLAN NOTE
Total cholesterol 193, HDL 20, triglycerides 572, LDL 58.  Start Tricor 145 mg daily, follow-up in 6 months.  Again strongly encouraged lifestyle modification.

## 2024-05-28 NOTE — PROGRESS NOTES
Subjective:       Patient ID: Javier Egan is a 38 y.o. male.    Chief Complaint: Annual Exam      HPI   This is a 38-year-old  male who presents to clinic today for an annual wellness exam.  Patient reports overall he is doing well.  He has no complaints today.  Review of Systems  Comprehensive review of systems negative except as stated in HPI    The patient's Health Maintenance was reviewed and the following appears to be due:   There are no preventive care reminders to display for this patient.    Past Medical History:  History reviewed. No pertinent past medical history.  History reviewed. No pertinent surgical history.  Review of patient's allergies indicates:  No Known Allergies  Current Outpatient Medications on File Prior to Visit   Medication Sig Dispense Refill    ascorbic acid, vitamin C, (VITAMIN C) 1000 MG tablet Take 1,000 mg by mouth once daily.       No current facility-administered medications on file prior to visit.     Social History     Socioeconomic History    Marital status: Single   Tobacco Use    Smoking status: Never     Passive exposure: Never    Smokeless tobacco: Never   Substance and Sexual Activity    Alcohol use: Yes     Comment: socially    Drug use: Never    Sexual activity: Not Currently     Social Determinants of Health     Financial Resource Strain: Low Risk  (5/8/2023)    Overall Financial Resource Strain (CARDIA)     Difficulty of Paying Living Expenses: Not hard at all   Food Insecurity: Patient Declined (5/8/2023)    Hunger Vital Sign     Worried About Running Out of Food in the Last Year: Patient declined     Ran Out of Food in the Last Year: Patient declined   Transportation Needs: Patient Declined (5/8/2023)    PRAPARE - Transportation     Lack of Transportation (Medical): Patient declined     Lack of Transportation (Non-Medical): Patient declined   Physical Activity: Inactive (5/8/2023)    Exercise Vital Sign     Days of Exercise per Week: 0 days     Minutes of  "Exercise per Session: 0 min   Stress: Patient Declined (5/8/2023)    [employer] [place] [employer] - Occupational Stress Questionnaire     Feeling of Stress : Patient declined   Housing Stability: Low Risk  (5/8/2023)    Housing Stability Vital Sign     Unable to Pay for Housing in the Last Year: No     Number of Places Lived in the Last Year: 1     Unstable Housing in the Last Year: No     Family History   Family history unknown: Yes       Objective:       /78 (BP Location: Left arm)   Pulse 97   Temp 97.8 °F (36.6 °C) (Oral)   Resp 16   Ht 5' 8" (1.727 m)   Wt 104.3 kg (230 lb)   SpO2 98%   BMI 34.97 kg/m²      Physical Exam  Vitals and nursing note reviewed.   Constitutional:       Appearance: Normal appearance. He is obese.   HENT:      Head: Normocephalic and atraumatic.      Right Ear: Tympanic membrane, ear canal and external ear normal.      Left Ear: Tympanic membrane, ear canal and external ear normal.      Nose: Nose normal.      Mouth/Throat:      Mouth: Mucous membranes are moist.      Pharynx: Oropharynx is clear.   Eyes:      Extraocular Movements: Extraocular movements intact.      Conjunctiva/sclera: Conjunctivae normal.      Pupils: Pupils are equal, round, and reactive to light.   Cardiovascular:      Rate and Rhythm: Normal rate and regular rhythm.      Pulses: Normal pulses.      Heart sounds: Normal heart sounds.   Pulmonary:      Effort: Pulmonary effort is normal.      Breath sounds: Normal breath sounds.   Abdominal:      General: Abdomen is flat. Bowel sounds are normal.      Palpations: Abdomen is soft.   Musculoskeletal:         General: Normal range of motion.      Cervical back: Normal range of motion and neck supple.   Skin:     General: Skin is warm and dry.   Neurological:      General: No focal deficit present.      Mental Status: He is alert and oriented to person, place, and time.   Psychiatric:         Mood and Affect: Mood normal.         Behavior: " Behavior normal.         Thought Content: Thought content normal.         Judgment: Judgment normal.         Labs  Lab Visit on 05/25/2024   Component Date Value Ref Range Status    Sodium 05/25/2024 144  136 - 145 mmol/L Final    Potassium 05/25/2024 3.8  3.5 - 5.1 mmol/L Final    Chloride 05/25/2024 111 (H)  98 - 107 mmol/L Final    CO2 05/25/2024 25  22 - 29 mmol/L Final    Glucose 05/25/2024 101 (H)  74 - 100 mg/dL Final    Blood Urea Nitrogen 05/25/2024 11.0  8.9 - 20.6 mg/dL Final    Creatinine 05/25/2024 0.85  0.73 - 1.18 mg/dL Final    Calcium 05/25/2024 8.8  8.4 - 10.2 mg/dL Final    Protein Total 05/25/2024 7.5  6.4 - 8.3 gm/dL Final    Albumin 05/25/2024 4.1  3.5 - 5.0 g/dL Final    Globulin 05/25/2024 3.4  2.4 - 3.5 gm/dL Final    Albumin/Globulin Ratio 05/25/2024 1.2  1.1 - 2.0 ratio Final    Bilirubin Total 05/25/2024 0.5  <=1.5 mg/dL Final    ALP 05/25/2024 117  40 - 150 unit/L Final    ALT 05/25/2024 25  0 - 55 unit/L Final    AST 05/25/2024 21  5 - 34 unit/L Final    eGFR 05/25/2024 >60  mL/min/1.73/m2 Final    Anion Gap 05/25/2024 8.0  mEq/L Final    BUN/Creatinine Ratio 05/25/2024 13   Final    Cholesterol Total 05/25/2024 193  <=200 mg/dL Final    HDL Cholesterol 05/25/2024 20 (L)  35 - 60 mg/dL Final    Triglyceride 05/25/2024 572 (H)  34 - 140 mg/dL Final    Cholesterol/HDL Ratio 05/25/2024 10 (H)  0 - 5 Final    TSH 05/25/2024 2.748  0.350 - 4.940 uIU/mL Final    Hemoglobin A1c 05/25/2024 5.4  <=7.0 % Final    Estimated Average Glucose 05/25/2024 108.3  mg/dL Final    WBC 05/25/2024 5.40  4.50 - 11.50 x10(3)/mcL Final    RBC 05/25/2024 6.03  4.70 - 6.10 x10(6)/mcL Final    Hgb 05/25/2024 16.0  14.0 - 18.0 g/dL Final    Hct 05/25/2024 50.3  42.0 - 52.0 % Final    MCV 05/25/2024 83.4  80.0 - 94.0 fL Final    MCH 05/25/2024 26.5 (L)  27.0 - 31.0 pg Final    MCHC 05/25/2024 31.8 (L)  33.0 - 36.0 g/dL Final    RDW 05/25/2024 14.2  11.5 - 17.0 % Final    Platelet 05/25/2024 127 (L)  130 - 400  x10(3)/mcL Final    MPV 05/25/2024 11.4 (H)  7.4 - 10.4 fL Final    Neut % 05/25/2024 44.6  % Final    Lymph % 05/25/2024 43.9  % Final    Mono % 05/25/2024 8.1  % Final    Eos % 05/25/2024 2.6  % Final    Basophil % 05/25/2024 0.6  % Final    Lymph # 05/25/2024 2.37  0.6 - 4.6 x10(3)/mcL Final    Neut # 05/25/2024 2.41  2.1 - 9.2 x10(3)/mcL Final    Mono # 05/25/2024 0.44  0.1 - 1.3 x10(3)/mcL Final    Eos # 05/25/2024 0.14  0 - 0.9 x10(3)/mcL Final    Baso # 05/25/2024 0.03  <=0.2 x10(3)/mcL Final    IG# 05/25/2024 0.01  0 - 0.04 x10(3)/mcL Final    IG% 05/25/2024 0.2  % Final       Assessment and Plan       ICD-10-CM ICD-9-CM   1. Annual physical exam  Z00.00 V70.0   2. Dyslipidemia  E78.5 272.4   3. Class 1 obesity due to excess calories with serious comorbidity and body mass index (BMI) of 34.0 to 34.9 in adult  E66.09 278.00    Z68.34 V85.34   4. Thrombocytopenia  D69.6 287.5        1. Annual physical exam  Overview:  Annual exam yearly in May    Assessment & Plan:  Boostrix in office today.      2. Dyslipidemia  Overview:  05/28/2024 - start Tricor 145 mg daily    Assessment & Plan:  Total cholesterol 193, HDL 20, triglycerides 572, LDL 58.  Start Tricor 145 mg daily, follow-up in 6 months.  Again strongly encouraged lifestyle modification.    Orders:  -     fenofibrate (TRICOR) 145 MG tablet; Take 1 tablet (145 mg total) by mouth once daily.  Dispense: 90 tablet; Refill: 3  -     Lipid Panel; Future; Expected date: 11/28/2024  -     Comprehensive Metabolic Panel; Future; Expected date: 11/28/2024    3. Class 1 obesity due to excess calories with serious comorbidity and body mass index (BMI) of 34.0 to 34.9 in adult  Assessment & Plan:  Discussed lifestyle modification including reducing carbohydrates and calories in diet as well as increasing exercise/aerobic activity.  Recommend at least 30 minutes per day of aerobic exercise.        4. Thrombocytopenia  Assessment & Plan:  Platelet very mildly low at 127,  will repeat in 6 months    Orders:  -     CBC Auto Differential; Future; Expected date: 11/28/2024           Follow up in about 6 months (around 11/28/2024) for follow up.

## 2024-09-02 PROBLEM — Z00.00 ANNUAL PHYSICAL EXAM: Status: RESOLVED | Noted: 2023-05-08 | Resolved: 2024-09-02

## 2024-11-21 ENCOUNTER — TELEPHONE (OUTPATIENT)
Dept: FAMILY MEDICINE | Facility: CLINIC | Age: 39
End: 2024-11-21
Payer: COMMERCIAL

## 2024-11-21 NOTE — TELEPHONE ENCOUNTER
Attempted to contact patient for previsit on 11/21/24 at 9:44. LV reminding patient about his upcoming visit with labs needed before his appt.

## 2024-11-29 ENCOUNTER — LAB VISIT (OUTPATIENT)
Dept: LAB | Facility: HOSPITAL | Age: 39
End: 2024-11-29
Attending: NURSE PRACTITIONER
Payer: COMMERCIAL

## 2024-11-29 DIAGNOSIS — D69.6 THROMBOCYTOPENIA: ICD-10-CM

## 2024-11-29 DIAGNOSIS — E78.5 DYSLIPIDEMIA: ICD-10-CM

## 2024-11-29 LAB
ALBUMIN SERPL-MCNC: 4.5 G/DL (ref 3.5–5)
ALBUMIN/GLOB SERPL: 1.2 RATIO (ref 1.1–2)
ALP SERPL-CCNC: 108 UNIT/L (ref 40–150)
ALT SERPL-CCNC: 47 UNIT/L (ref 0–55)
ANION GAP SERPL CALC-SCNC: 7 MEQ/L
AST SERPL-CCNC: 29 UNIT/L (ref 5–34)
BASOPHILS # BLD AUTO: 0.03 X10(3)/MCL
BASOPHILS NFR BLD AUTO: 0.5 %
BILIRUB SERPL-MCNC: 0.6 MG/DL
BUN SERPL-MCNC: 13.9 MG/DL (ref 8.9–20.6)
CALCIUM SERPL-MCNC: 9 MG/DL (ref 8.4–10.2)
CHLORIDE SERPL-SCNC: 107 MMOL/L (ref 98–107)
CHOLEST SERPL-MCNC: 142 MG/DL
CHOLEST/HDLC SERPL: 7 {RATIO} (ref 0–5)
CO2 SERPL-SCNC: 26 MMOL/L (ref 22–29)
CREAT SERPL-MCNC: 0.84 MG/DL (ref 0.72–1.25)
CREAT/UREA NIT SERPL: 17
EOSINOPHIL # BLD AUTO: 0.11 X10(3)/MCL (ref 0–0.9)
EOSINOPHIL NFR BLD AUTO: 1.8 %
ERYTHROCYTE [DISTWIDTH] IN BLOOD BY AUTOMATED COUNT: 13.5 % (ref 11.5–17)
GFR SERPLBLD CREATININE-BSD FMLA CKD-EPI: >60 ML/MIN/1.73/M2
GLOBULIN SER-MCNC: 3.9 GM/DL (ref 2.4–3.5)
GLUCOSE SERPL-MCNC: 85 MG/DL (ref 74–100)
HCT VFR BLD AUTO: 52.1 % (ref 42–52)
HDLC SERPL-MCNC: 21 MG/DL (ref 35–60)
HGB BLD-MCNC: 17.2 G/DL (ref 14–18)
IMM GRANULOCYTES # BLD AUTO: 0 X10(3)/MCL (ref 0–0.04)
IMM GRANULOCYTES NFR BLD AUTO: 0 %
LDLC SERPL CALC-MCNC: 49 MG/DL (ref 50–140)
LYMPHOCYTES # BLD AUTO: 2.45 X10(3)/MCL (ref 0.6–4.6)
LYMPHOCYTES NFR BLD AUTO: 41.2 %
MCH RBC QN AUTO: 28.3 PG (ref 27–31)
MCHC RBC AUTO-ENTMCNC: 33 G/DL (ref 33–36)
MCV RBC AUTO: 85.7 FL (ref 80–94)
MONOCYTES # BLD AUTO: 0.42 X10(3)/MCL (ref 0.1–1.3)
MONOCYTES NFR BLD AUTO: 7.1 %
NEUTROPHILS # BLD AUTO: 2.94 X10(3)/MCL (ref 2.1–9.2)
NEUTROPHILS NFR BLD AUTO: 49.4 %
PLATELET # BLD AUTO: 144 X10(3)/MCL (ref 130–400)
PMV BLD AUTO: 11.3 FL (ref 7.4–10.4)
POTASSIUM SERPL-SCNC: 4.3 MMOL/L (ref 3.5–5.1)
PROT SERPL-MCNC: 8.4 GM/DL (ref 6.4–8.3)
RBC # BLD AUTO: 6.08 X10(6)/MCL (ref 4.7–6.1)
SODIUM SERPL-SCNC: 140 MMOL/L (ref 136–145)
TRIGL SERPL-MCNC: 362 MG/DL (ref 34–140)
VLDLC SERPL CALC-MCNC: 72 MG/DL
WBC # BLD AUTO: 5.95 X10(3)/MCL (ref 4.5–11.5)

## 2024-11-29 PROCEDURE — 80061 LIPID PANEL: CPT

## 2024-11-29 PROCEDURE — 85025 COMPLETE CBC W/AUTO DIFF WBC: CPT

## 2024-11-29 PROCEDURE — 80053 COMPREHEN METABOLIC PANEL: CPT

## 2024-11-29 PROCEDURE — 36415 COLL VENOUS BLD VENIPUNCTURE: CPT

## 2024-12-02 ENCOUNTER — OFFICE VISIT (OUTPATIENT)
Dept: FAMILY MEDICINE | Facility: CLINIC | Age: 39
End: 2024-12-02
Payer: COMMERCIAL

## 2024-12-02 VITALS
HEIGHT: 68 IN | TEMPERATURE: 98 F | RESPIRATION RATE: 16 BRPM | BODY MASS INDEX: 36.22 KG/M2 | WEIGHT: 239 LBS | HEART RATE: 74 BPM | DIASTOLIC BLOOD PRESSURE: 82 MMHG | OXYGEN SATURATION: 98 % | SYSTOLIC BLOOD PRESSURE: 123 MMHG

## 2024-12-02 DIAGNOSIS — D69.6 THROMBOCYTOPENIA: ICD-10-CM

## 2024-12-02 DIAGNOSIS — Z00.00 ANNUAL PHYSICAL EXAM: ICD-10-CM

## 2024-12-02 DIAGNOSIS — Z13.1 SCREENING FOR DIABETES MELLITUS: ICD-10-CM

## 2024-12-02 DIAGNOSIS — Z23 NEED FOR INFLUENZA VACCINATION: ICD-10-CM

## 2024-12-02 DIAGNOSIS — E78.5 DYSLIPIDEMIA: Primary | ICD-10-CM

## 2024-12-02 PROCEDURE — 3044F HG A1C LEVEL LT 7.0%: CPT | Mod: CPTII,,, | Performed by: NURSE PRACTITIONER

## 2024-12-02 PROCEDURE — 90656 IIV3 VACC NO PRSV 0.5 ML IM: CPT | Mod: ,,, | Performed by: NURSE PRACTITIONER

## 2024-12-02 PROCEDURE — 3008F BODY MASS INDEX DOCD: CPT | Mod: CPTII,,, | Performed by: NURSE PRACTITIONER

## 2024-12-02 PROCEDURE — 3074F SYST BP LT 130 MM HG: CPT | Mod: CPTII,,, | Performed by: NURSE PRACTITIONER

## 2024-12-02 PROCEDURE — 1160F RVW MEDS BY RX/DR IN RCRD: CPT | Mod: CPTII,,, | Performed by: NURSE PRACTITIONER

## 2024-12-02 PROCEDURE — 1159F MED LIST DOCD IN RCRD: CPT | Mod: CPTII,,, | Performed by: NURSE PRACTITIONER

## 2024-12-02 PROCEDURE — 90471 IMMUNIZATION ADMIN: CPT | Mod: ,,, | Performed by: NURSE PRACTITIONER

## 2024-12-02 PROCEDURE — 3079F DIAST BP 80-89 MM HG: CPT | Mod: CPTII,,, | Performed by: NURSE PRACTITIONER

## 2024-12-02 PROCEDURE — 99214 OFFICE O/P EST MOD 30 MIN: CPT | Mod: 25,,, | Performed by: NURSE PRACTITIONER

## 2024-12-02 RX ORDER — FENOFIBRATE 145 MG/1
145 TABLET, FILM COATED ORAL DAILY
Qty: 90 TABLET | Refills: 3 | Status: SHIPPED | OUTPATIENT
Start: 2024-12-02 | End: 2025-12-02

## 2024-12-02 NOTE — PROGRESS NOTES
Subjective:       Patient ID: Javier Egan is a 39 y.o. male.    Chief Complaint: Dyslipidemia (6m fu)      HPI   This is a 39-year-old male who presents to the clinic today for six-month follow-up for dyslipidemia and thrombocytopenia.  Reports that he forgets to take his cholesterol medicine most days.  No complaints.  Review of Systems  Comprehensive review of systems negative except as stated in HPI    The patient's Health Maintenance was reviewed and the following appears to be due:   Health Maintenance Due   Topic Date Due    Influenza Vaccine (1) 09/01/2024       Past Medical History:  History reviewed. No pertinent past medical history.  History reviewed. No pertinent surgical history.  Review of patient's allergies indicates:  No Known Allergies  Current Outpatient Medications on File Prior to Visit   Medication Sig Dispense Refill    ascorbic acid, vitamin C, (VITAMIN C) 1000 MG tablet Take 1,000 mg by mouth once daily.      [DISCONTINUED] fenofibrate (TRICOR) 145 MG tablet Take 1 tablet (145 mg total) by mouth once daily. 90 tablet 3     No current facility-administered medications on file prior to visit.     Social History     Socioeconomic History    Marital status: Single   Tobacco Use    Smoking status: Never     Passive exposure: Never    Smokeless tobacco: Never   Substance and Sexual Activity    Alcohol use: Yes     Comment: socially    Drug use: Never    Sexual activity: Not Currently     Social Drivers of Health     Financial Resource Strain: Low Risk  (5/8/2023)    Overall Financial Resource Strain (CARDIA)     Difficulty of Paying Living Expenses: Not hard at all   Food Insecurity: Patient Declined (5/8/2023)    Hunger Vital Sign     Worried About Running Out of Food in the Last Year: Patient declined     Ran Out of Food in the Last Year: Patient declined   Transportation Needs: Patient Declined (5/8/2023)    PRAPARE - Transportation     Lack of Transportation (Medical): Patient declined      "Lack of Transportation (Non-Medical): Patient declined   Physical Activity: Inactive (5/8/2023)    Exercise Vital Sign     Days of Exercise per Week: 0 days     Minutes of Exercise per Session: 0 min   Stress: Patient Declined (5/8/2023)    [employer] - Occupational Stress Questionnaire     Feeling of Stress : Patient declined   Housing Stability: Low Risk  (5/8/2023)    Housing Stability Vital Sign     Unable to Pay for Housing in the Last Year: No     Number of Places Lived in the Last Year: 1     Unstable Housing in the Last Year: No     Family History   Family history unknown: Yes       Objective:       /82 (BP Location: Left arm)   Pulse 74   Temp 98.1 °F (36.7 °C) (Oral)   Resp 16   Ht 5' 8" (1.727 m)   Wt 108.4 kg (239 lb)   SpO2 98%   BMI 36.34 kg/m²      Physical Exam  Vitals and nursing note reviewed.   Constitutional:       Appearance: Normal appearance. He is obese.   HENT:      Head: Normocephalic and atraumatic.      Right Ear: Tympanic membrane, ear canal and external ear normal.      Left Ear: Tympanic membrane, ear canal and external ear normal.      Nose: Nose normal.      Mouth/Throat:      Mouth: Mucous membranes are moist.      Pharynx: Oropharynx is clear.   Eyes:      Extraocular Movements: Extraocular movements intact.      Conjunctiva/sclera: Conjunctivae normal.      Pupils: Pupils are equal, round, and reactive to light.   Cardiovascular:      Rate and Rhythm: Normal rate and regular rhythm.      Pulses: Normal pulses.      Heart sounds: Normal heart sounds.   Pulmonary:      Effort: Pulmonary effort is normal.      Breath sounds: Normal breath sounds.   Musculoskeletal:         General: Normal range of motion.      Cervical back: Normal range of motion and neck supple.   Skin:     General: Skin is warm and dry.   Neurological:      General: No focal deficit present.      Mental Status: He is alert and oriented to person, place, and time. "   Psychiatric:         Mood and Affect: Mood normal.         Behavior: Behavior normal.         Thought Content: Thought content normal.         Judgment: Judgment normal.         Labs  Lab Visit on 11/29/2024   Component Date Value Ref Range Status    Sodium 11/29/2024 140  136 - 145 mmol/L Final    Potassium 11/29/2024 4.3  3.5 - 5.1 mmol/L Final    Chloride 11/29/2024 107  98 - 107 mmol/L Final    CO2 11/29/2024 26  22 - 29 mmol/L Final    Glucose 11/29/2024 85  74 - 100 mg/dL Final    Blood Urea Nitrogen 11/29/2024 13.9  8.9 - 20.6 mg/dL Final    Creatinine 11/29/2024 0.84  0.72 - 1.25 mg/dL Final    Calcium 11/29/2024 9.0  8.4 - 10.2 mg/dL Final    Protein Total 11/29/2024 8.4 (H)  6.4 - 8.3 gm/dL Final    Albumin 11/29/2024 4.5  3.5 - 5.0 g/dL Final    Globulin 11/29/2024 3.9 (H)  2.4 - 3.5 gm/dL Final    Albumin/Globulin Ratio 11/29/2024 1.2  1.1 - 2.0 ratio Final    Bilirubin Total 11/29/2024 0.6  <=1.5 mg/dL Final    ALP 11/29/2024 108  40 - 150 unit/L Final    ALT 11/29/2024 47  0 - 55 unit/L Final    AST 11/29/2024 29  5 - 34 unit/L Final    eGFR 11/29/2024 >60  mL/min/1.73/m2 Final    Anion Gap 11/29/2024 7.0  mEq/L Final    BUN/Creatinine Ratio 11/29/2024 17   Final    Cholesterol Total 11/29/2024 142  <=200 mg/dL Final    HDL Cholesterol 11/29/2024 21 (L)  35 - 60 mg/dL Final    Triglyceride 11/29/2024 362 (H)  34 - 140 mg/dL Final    Cholesterol/HDL Ratio 11/29/2024 7 (H)  0 - 5 Final    Very Low Density Lipoprotein 11/29/2024 72   Final    LDL Cholesterol 11/29/2024 49.00 (L)  50.00 - 140.00 mg/dL Final    WBC 11/29/2024 5.95  4.50 - 11.50 x10(3)/mcL Final    RBC 11/29/2024 6.08  4.70 - 6.10 x10(6)/mcL Final    Hgb 11/29/2024 17.2  14.0 - 18.0 g/dL Final    Hct 11/29/2024 52.1 (H)  42.0 - 52.0 % Final    MCV 11/29/2024 85.7  80.0 - 94.0 fL Final    MCH 11/29/2024 28.3  27.0 - 31.0 pg Final    MCHC 11/29/2024 33.0  33.0 - 36.0 g/dL Final    RDW 11/29/2024 13.5  11.5 - 17.0 % Final    Platelet  11/29/2024 144  130 - 400 x10(3)/mcL Final    MPV 11/29/2024 11.3 (H)  7.4 - 10.4 fL Final    Neut % 11/29/2024 49.4  % Final    Lymph % 11/29/2024 41.2  % Final    Mono % 11/29/2024 7.1  % Final    Eos % 11/29/2024 1.8  % Final    Basophil % 11/29/2024 0.5  % Final    Lymph # 11/29/2024 2.45  0.6 - 4.6 x10(3)/mcL Final    Neut # 11/29/2024 2.94  2.1 - 9.2 x10(3)/mcL Final    Mono # 11/29/2024 0.42  0.1 - 1.3 x10(3)/mcL Final    Eos # 11/29/2024 0.11  0 - 0.9 x10(3)/mcL Final    Baso # 11/29/2024 0.03  <=0.2 x10(3)/mcL Final    IG# 11/29/2024 0.00  0 - 0.04 x10(3)/mcL Final    IG% 11/29/2024 0.0  % Final       Assessment and Plan       ICD-10-CM ICD-9-CM   1. Dyslipidemia  E78.5 272.4   2. Thrombocytopenia  D69.6 287.5   3. Need for influenza vaccination  Z23 V04.81   4. Annual physical exam  Z00.00 V70.0   5. Screening for diabetes mellitus  Z13.1 V77.1        1. Dyslipidemia  Overview:  05/28/2024 - start Tricor 145 mg daily    Assessment & Plan:  Not consistent with his Tricor, total cholesterol 142, HDL 21, triglycerides 362, LDL 49.  Encouraged daily compliance with Tricor, follow-up 6 months.    Orders:  -     fenofibrate (TRICOR) 145 MG tablet; Take 1 tablet (145 mg total) by mouth once daily.  Dispense: 90 tablet; Refill: 3    2. Thrombocytopenia  Assessment & Plan:  Improved, platelets up to 144, will recheck in 6 months.  No easy bruising or bleeding reported.      3. Need for influenza vaccination  -     influenza (Flulaval, Fluzone, Fluarix) 45 mcg/0.5 mL IM vaccine (> or = 6 mo) 0.5 mL    4. Annual physical exam  -     CBC Auto Differential; Future; Expected date: 06/02/2025  -     Comprehensive Metabolic Panel; Future; Expected date: 06/02/2025  -     Lipid Panel; Future; Expected date: 06/02/2025  -     TSH; Future; Expected date: 06/02/2025  -     Hemoglobin A1C; Future; Expected date: 06/02/2025    5. Screening for diabetes mellitus  -     Hemoglobin A1C; Future; Expected date: 06/02/2025            Follow up in 6 months (on 5/29/2025) for Annual.

## 2024-12-02 NOTE — ASSESSMENT & PLAN NOTE
Not consistent with his Tricor, total cholesterol 142, HDL 21, triglycerides 362, LDL 49.  Encouraged daily compliance with Tricor, follow-up 6 months.

## 2025-05-22 ENCOUNTER — TELEPHONE (OUTPATIENT)
Dept: FAMILY MEDICINE | Facility: CLINIC | Age: 40
End: 2025-05-22
Payer: COMMERCIAL

## 2025-05-24 ENCOUNTER — LAB VISIT (OUTPATIENT)
Dept: LAB | Facility: HOSPITAL | Age: 40
End: 2025-05-24
Attending: NURSE PRACTITIONER
Payer: COMMERCIAL

## 2025-05-24 DIAGNOSIS — Z00.00 ANNUAL PHYSICAL EXAM: ICD-10-CM

## 2025-05-24 DIAGNOSIS — Z13.1 SCREENING FOR DIABETES MELLITUS: ICD-10-CM

## 2025-05-24 LAB
ALBUMIN SERPL-MCNC: 4.1 G/DL (ref 3.5–5)
ALBUMIN/GLOB SERPL: 1 RATIO (ref 1.1–2)
ALP SERPL-CCNC: 108 UNIT/L (ref 40–150)
ALT SERPL-CCNC: 47 UNIT/L (ref 0–55)
ANION GAP SERPL CALC-SCNC: 7 MEQ/L
AST SERPL-CCNC: 30 UNIT/L (ref 11–45)
BASOPHILS # BLD AUTO: 0.03 X10(3)/MCL
BASOPHILS NFR BLD AUTO: 0.5 %
BILIRUB SERPL-MCNC: 0.6 MG/DL
BUN SERPL-MCNC: 16.2 MG/DL (ref 8.9–20.6)
CALCIUM SERPL-MCNC: 8.4 MG/DL (ref 8.4–10.2)
CHLORIDE SERPL-SCNC: 107 MMOL/L (ref 98–107)
CHOLEST SERPL-MCNC: 176 MG/DL
CHOLEST/HDLC SERPL: 9 {RATIO} (ref 0–5)
CO2 SERPL-SCNC: 22 MMOL/L (ref 22–29)
CREAT SERPL-MCNC: 0.72 MG/DL (ref 0.72–1.25)
CREAT/UREA NIT SERPL: 23
EOSINOPHIL # BLD AUTO: 0.15 X10(3)/MCL (ref 0–0.9)
EOSINOPHIL NFR BLD AUTO: 2.5 %
ERYTHROCYTE [DISTWIDTH] IN BLOOD BY AUTOMATED COUNT: 13.1 % (ref 11.5–17)
EST. AVERAGE GLUCOSE BLD GHB EST-MCNC: 111.2 MG/DL
GFR SERPLBLD CREATININE-BSD FMLA CKD-EPI: >60 ML/MIN/1.73/M2
GLOBULIN SER-MCNC: 4 GM/DL (ref 2.4–3.5)
GLUCOSE SERPL-MCNC: 93 MG/DL (ref 74–100)
HBA1C MFR BLD: 5.5 %
HCT VFR BLD AUTO: 47.4 % (ref 42–52)
HDLC SERPL-MCNC: 20 MG/DL (ref 35–60)
HGB BLD-MCNC: 16.3 G/DL (ref 14–18)
IMM GRANULOCYTES # BLD AUTO: 0.01 X10(3)/MCL (ref 0–0.04)
IMM GRANULOCYTES NFR BLD AUTO: 0.2 %
LYMPHOCYTES # BLD AUTO: 1.93 X10(3)/MCL (ref 0.6–4.6)
LYMPHOCYTES NFR BLD AUTO: 32.2 %
MCH RBC QN AUTO: 28.6 PG (ref 27–31)
MCHC RBC AUTO-ENTMCNC: 34.4 G/DL (ref 33–36)
MCV RBC AUTO: 83.3 FL (ref 80–94)
MONOCYTES # BLD AUTO: 0.41 X10(3)/MCL (ref 0.1–1.3)
MONOCYTES NFR BLD AUTO: 6.8 %
NEUTROPHILS # BLD AUTO: 3.47 X10(3)/MCL (ref 2.1–9.2)
NEUTROPHILS NFR BLD AUTO: 57.8 %
PLATELET # BLD AUTO: 128 X10(3)/MCL (ref 130–400)
PMV BLD AUTO: 10.7 FL (ref 7.4–10.4)
POTASSIUM SERPL-SCNC: 4 MMOL/L (ref 3.5–5.1)
PROT SERPL-MCNC: 8.1 GM/DL (ref 6.4–8.3)
RBC # BLD AUTO: 5.69 X10(6)/MCL (ref 4.7–6.1)
SODIUM SERPL-SCNC: 136 MMOL/L (ref 136–145)
TRIGL SERPL-MCNC: 626 MG/DL (ref 34–140)
TSH SERPL-ACNC: 2.34 UIU/ML (ref 0.35–4.94)
WBC # BLD AUTO: 6 X10(3)/MCL (ref 4.5–11.5)

## 2025-05-24 PROCEDURE — 84443 ASSAY THYROID STIM HORMONE: CPT

## 2025-05-24 PROCEDURE — 83036 HEMOGLOBIN GLYCOSYLATED A1C: CPT

## 2025-05-24 PROCEDURE — 80053 COMPREHEN METABOLIC PANEL: CPT

## 2025-05-24 PROCEDURE — 36415 COLL VENOUS BLD VENIPUNCTURE: CPT

## 2025-05-24 PROCEDURE — 80061 LIPID PANEL: CPT

## 2025-05-24 PROCEDURE — 85025 COMPLETE CBC W/AUTO DIFF WBC: CPT

## 2025-05-27 ENCOUNTER — RESULTS FOLLOW-UP (OUTPATIENT)
Dept: FAMILY MEDICINE | Facility: CLINIC | Age: 40
End: 2025-05-27

## 2025-05-29 ENCOUNTER — OFFICE VISIT (OUTPATIENT)
Dept: FAMILY MEDICINE | Facility: CLINIC | Age: 40
End: 2025-05-29
Payer: COMMERCIAL

## 2025-05-29 VITALS
HEART RATE: 73 BPM | DIASTOLIC BLOOD PRESSURE: 73 MMHG | RESPIRATION RATE: 15 BRPM | BODY MASS INDEX: 37.1 KG/M2 | TEMPERATURE: 98 F | WEIGHT: 244 LBS | OXYGEN SATURATION: 94 % | SYSTOLIC BLOOD PRESSURE: 134 MMHG

## 2025-05-29 DIAGNOSIS — B35.3 TINEA PEDIS OF BOTH FEET: ICD-10-CM

## 2025-05-29 DIAGNOSIS — E66.01 SEVERE OBESITY (BMI 35.0-39.9) WITH COMORBIDITY: ICD-10-CM

## 2025-05-29 DIAGNOSIS — E78.5 DYSLIPIDEMIA: ICD-10-CM

## 2025-05-29 DIAGNOSIS — D69.6 THROMBOCYTOPENIA: ICD-10-CM

## 2025-05-29 DIAGNOSIS — Z00.00 ANNUAL PHYSICAL EXAM: Primary | ICD-10-CM

## 2025-05-29 PROBLEM — E66.811 CLASS 1 OBESITY DUE TO EXCESS CALORIES WITH SERIOUS COMORBIDITY AND BODY MASS INDEX (BMI) OF 34.0 TO 34.9 IN ADULT: Status: RESOLVED | Noted: 2024-05-28 | Resolved: 2025-05-29

## 2025-05-29 PROBLEM — E66.09 CLASS 1 OBESITY DUE TO EXCESS CALORIES WITH SERIOUS COMORBIDITY AND BODY MASS INDEX (BMI) OF 34.0 TO 34.9 IN ADULT: Status: RESOLVED | Noted: 2024-05-28 | Resolved: 2025-05-29

## 2025-05-29 PROCEDURE — 1159F MED LIST DOCD IN RCRD: CPT | Mod: CPTII,,, | Performed by: NURSE PRACTITIONER

## 2025-05-29 PROCEDURE — 3078F DIAST BP <80 MM HG: CPT | Mod: CPTII,,, | Performed by: NURSE PRACTITIONER

## 2025-05-29 PROCEDURE — 3075F SYST BP GE 130 - 139MM HG: CPT | Mod: CPTII,,, | Performed by: NURSE PRACTITIONER

## 2025-05-29 PROCEDURE — 3008F BODY MASS INDEX DOCD: CPT | Mod: CPTII,,, | Performed by: NURSE PRACTITIONER

## 2025-05-29 PROCEDURE — 3044F HG A1C LEVEL LT 7.0%: CPT | Mod: CPTII,,, | Performed by: NURSE PRACTITIONER

## 2025-05-29 PROCEDURE — 1160F RVW MEDS BY RX/DR IN RCRD: CPT | Mod: CPTII,,, | Performed by: NURSE PRACTITIONER

## 2025-05-29 PROCEDURE — 99395 PREV VISIT EST AGE 18-39: CPT | Mod: ,,, | Performed by: NURSE PRACTITIONER

## 2025-05-29 RX ORDER — FENOFIBRATE 145 MG/1
145 TABLET, FILM COATED ORAL DAILY
Qty: 90 TABLET | Refills: 3 | Status: SHIPPED | OUTPATIENT
Start: 2025-05-29 | End: 2026-05-29

## 2025-05-29 RX ORDER — KETOCONAZOLE 20 MG/G
CREAM TOPICAL DAILY
Qty: 60 G | Refills: 11 | Status: SHIPPED | OUTPATIENT
Start: 2025-05-29

## 2025-05-29 NOTE — ASSESSMENT & PLAN NOTE
- Discussed importance of keeping feet dry to prevent fungal infections, especially when wearing boots for extended periods.  - Recommend changing socks regularly and a two-part treatment approach: prescribed antifungal cream for nighttime application and recommended OTC antifungal dry powder spray for daytime use on feet and in shoes.

## 2025-05-29 NOTE — ASSESSMENT & PLAN NOTE
- Monitored patient's weight at 244 lbs, noting a concerning upward trend from 230 lbs last year and 227 lbs the year before.  - BMI confirms classification in the severe obesity category.  - Patient has decreased physical activity, previously walking regularly in the park.  - Advised to resume walking or attend gym for regular exercise.  - Recommend adopting a low-calorie, low-carbohydrate diet to help with weight management and improve triglyceride levels.

## 2025-05-29 NOTE — PROGRESS NOTES
Subjective:       Patient ID: Javier Egan is a 39 y.o. male.    Chief Complaint: Annual Exam      History of Present Illness    CHIEF COMPLAINT:  Patient presents today for wellness visit and lab results review.    WEIGHT MANAGEMENT:  He reports current weight of 244 lbs, representing a 14-pound weight gain from previous year. His physical activity has decreased, specifically noting reduced frequency of walking in the park.    FOOT CONCERNS:  He has had persistent foot fungus, currently treating with antifungal paste nightly. A few months ago, he experienced severe foot pain affecting his ability to walk. While the condition has improved, symptoms persist. Due to these foot problems, he discontinued his cholesterol medication.    LABS:  Triglycerides were elevated at 626. Total cholesterol was 176. HDL was low. Platelet count was slightly low but not of clinical concern. Kidney function, liver function, blood sugar, and thyroid tests were within normal limits.        Review of Systems  Comprehensive review of systems negative except as stated in HPI    The patient's Health Maintenance was reviewed and the following appears to be due:   There are no preventive care reminders to display for this patient.    Past Medical History:  History reviewed. No pertinent past medical history.  History reviewed. No pertinent surgical history.  Review of patient's allergies indicates:  No Known Allergies  Medications Ordered Prior to Encounter[1]  Social History[2]  Family History   Family history unknown: Yes       Objective:       /73 (Patient Position: Sitting)   Pulse 73   Temp 98.3 °F (36.8 °C) (Oral)   Resp 15   Wt 110.7 kg (244 lb)   SpO2 (!) 94%   BMI 37.10 kg/m²      Physical Exam  Vitals and nursing note reviewed.   Constitutional:       Appearance: Normal appearance. He is obese.   HENT:      Head: Normocephalic and atraumatic.      Right Ear: Tympanic membrane, ear canal and external ear normal.      Left  Ear: Tympanic membrane, ear canal and external ear normal.      Nose: Nose normal.      Mouth/Throat:      Mouth: Mucous membranes are moist.      Pharynx: Oropharynx is clear.   Eyes:      Extraocular Movements: Extraocular movements intact.      Conjunctiva/sclera: Conjunctivae normal.      Pupils: Pupils are equal, round, and reactive to light.   Cardiovascular:      Rate and Rhythm: Normal rate and regular rhythm.      Pulses: Normal pulses.      Heart sounds: Normal heart sounds.   Pulmonary:      Effort: Pulmonary effort is normal.      Breath sounds: Normal breath sounds.   Abdominal:      General: Abdomen is flat. Bowel sounds are normal.      Palpations: Abdomen is soft.   Musculoskeletal:         General: Normal range of motion.      Cervical back: Normal range of motion and neck supple.   Skin:     General: Skin is warm and dry.   Neurological:      General: No focal deficit present.      Mental Status: He is alert and oriented to person, place, and time.   Psychiatric:         Mood and Affect: Mood normal.         Behavior: Behavior normal.         Thought Content: Thought content normal.         Judgment: Judgment normal.         Labs  Lab Visit on 05/24/2025   Component Date Value Ref Range Status    Sodium 05/24/2025 136  136 - 145 mmol/L Final    Potassium 05/24/2025 4.0  3.5 - 5.1 mmol/L Final    Chloride 05/24/2025 107  98 - 107 mmol/L Final    CO2 05/24/2025 22  22 - 29 mmol/L Final    Glucose 05/24/2025 93  74 - 100 mg/dL Final    Blood Urea Nitrogen 05/24/2025 16.2  8.9 - 20.6 mg/dL Final    Creatinine 05/24/2025 0.72  0.72 - 1.25 mg/dL Final    Calcium 05/24/2025 8.4  8.4 - 10.2 mg/dL Final    Protein Total 05/24/2025 8.1  6.4 - 8.3 gm/dL Final    Albumin 05/24/2025 4.1  3.5 - 5.0 g/dL Final    Globulin 05/24/2025 4.0 (H)  2.4 - 3.5 gm/dL Final    Albumin/Globulin Ratio 05/24/2025 1.0 (L)  1.1 - 2.0 ratio Final    Bilirubin Total 05/24/2025 0.6  <=1.5 mg/dL Final    ALP 05/24/2025 108  40 - 150  unit/L Final    ALT 05/24/2025 47  0 - 55 unit/L Final    AST 05/24/2025 30  11 - 45 unit/L Final    eGFR 05/24/2025 >60  mL/min/1.73/m2 Final    Estimated GFR calculated using the CKD-EPI creatinine (2021) equation.    Anion Gap 05/24/2025 7.0  mEq/L Final    BUN/Creatinine Ratio 05/24/2025 23   Final    Cholesterol Total 05/24/2025 176  <=200 mg/dL Final    HDL Cholesterol 05/24/2025 20 (L)  35 - 60 mg/dL Final    Triglyceride 05/24/2025 626 (H)  34 - 140 mg/dL Final    Cholesterol/HDL Ratio 05/24/2025 9 (H)  0 - 5 Final    TSH 05/24/2025 2.342  0.350 - 4.940 uIU/mL Final    Hemoglobin A1c 05/24/2025 5.5  <=7.0 % Final    Estimated Average Glucose 05/24/2025 111.2  mg/dL Final    WBC 05/24/2025 6.00  4.50 - 11.50 x10(3)/mcL Final    RBC 05/24/2025 5.69  4.70 - 6.10 x10(6)/mcL Final    Hgb 05/24/2025 16.3  14.0 - 18.0 g/dL Final    Hct 05/24/2025 47.4  42.0 - 52.0 % Final    MCV 05/24/2025 83.3  80.0 - 94.0 fL Final    MCH 05/24/2025 28.6  27.0 - 31.0 pg Final    MCHC 05/24/2025 34.4  33.0 - 36.0 g/dL Final    RDW 05/24/2025 13.1  11.5 - 17.0 % Final    Platelet 05/24/2025 128 (L)  130 - 400 x10(3)/mcL Final    MPV 05/24/2025 10.7 (H)  7.4 - 10.4 fL Final    Neut % 05/24/2025 57.8  % Final    Lymph % 05/24/2025 32.2  % Final    Mono % 05/24/2025 6.8  % Final    Eos % 05/24/2025 2.5  % Final    Basophil % 05/24/2025 0.5  % Final    Imm Grans % 05/24/2025 0.2  % Final    Neut # 05/24/2025 3.47  2.1 - 9.2 x10(3)/mcL Final    Lymph # 05/24/2025 1.93  0.6 - 4.6 x10(3)/mcL Final    Mono # 05/24/2025 0.41  0.1 - 1.3 x10(3)/mcL Final    Eos # 05/24/2025 0.15  0 - 0.9 x10(3)/mcL Final    Baso # 05/24/2025 0.03  <=0.2 x10(3)/mcL Final    Imm Gran # 05/24/2025 0.01  0.00 - 0.04 x10(3)/mcL Final       Assessment and Plan     Assessment & Plan    E66.01 Severe obesity (BMI 35.0-39.9) with comorbidity  Z00.00 Annual physical exam  E78.5 Dyslipidemia  D69.6 Thrombocytopenia  B35.3 Tinea pedis of both feet    IMPRESSION:  -  Assessed cholesterol levels: total cholesterol 176, triglycerides significantly elevated at 626.  - Evaluated foot fungus, likely athlete's foot exacerbated by prolonged boot wear.  - Weight increased from 230 to 244 lbs over the past year, now in significant obesity category.    SEVERE OBESITY (BMI 35.0-39.9) WITH COMORBIDITY:  - Monitored patient's weight at 244 lbs, noting a concerning upward trend from 230 lbs last year and 227 lbs the year before.  - BMI confirms classification in the severe obesity category.  - Patient has decreased physical activity, previously walking regularly in the park.  - Advised to resume walking or attend gym for regular exercise.  - Recommend adopting a low-calorie, low-carbohydrate diet to help with weight management and improve triglyceride levels.    ANNUAL PHYSICAL EXAM:  - Scheduled follow up in 6 months.    DYSLIPIDEMIA:  - Explained risks associated with high triglycerides, emphasizing potential for arterial clogging.  - Restarted fenofibrate to address elevated levels.  - Advised patient to avoid fried foods as part of dietary management.  - Ordered follow-up labs in 6 months to recheck triglyceride levels.    TINEA PEDIS OF BOTH FEET:  - Discussed importance of keeping feet dry to prevent fungal infections, especially when wearing boots for extended periods.  - Recommend changing socks regularly and a two-part treatment approach: prescribed antifungal cream for nighttime application and recommended OTC antifungal dry powder spray for daytime use on feet and in shoes.           1. Annual physical exam  Overview:  Annual exam yearly in May      2. Dyslipidemia  Overview:  05/28/2024 - start Tricor 145 mg daily    Assessment & Plan:  - Explained risks associated with high triglycerides, emphasizing potential for arterial clogging.  - Restarted fenofibrate to address elevated levels.  - Advised patient to avoid fried foods as part of dietary management.  - Ordered follow-up labs  in 6 months to recheck triglyceride levels.    Orders:  -     Comprehensive Metabolic Panel; Future; Expected date: 11/29/2025  -     Lipid Panel; Future; Expected date: 11/29/2025  -     fenofibrate (TRICOR) 145 MG tablet; Take 1 tablet (145 mg total) by mouth once daily.  Dispense: 90 tablet; Refill: 3    3. Thrombocytopenia  -     CBC Auto Differential; Future; Expected date: 11/29/2025    4. Severe obesity (BMI 35.0-39.9) with comorbidity  Assessment & Plan:  - Monitored patient's weight at 244 lbs, noting a concerning upward trend from 230 lbs last year and 227 lbs the year before.  - BMI confirms classification in the severe obesity category.  - Patient has decreased physical activity, previously walking regularly in the park.  - Advised to resume walking or attend gym for regular exercise.  - Recommend adopting a low-calorie, low-carbohydrate diet to help with weight management and improve triglyceride levels.      5. Tinea pedis of both feet  Assessment & Plan:  - Discussed importance of keeping feet dry to prevent fungal infections, especially when wearing boots for extended periods.  - Recommend changing socks regularly and a two-part treatment approach: prescribed antifungal cream for nighttime application and recommended OTC antifungal dry powder spray for daytime use on feet and in shoes.     Orders:  -     ketoconazole (NIZORAL) 2 % cream; Apply topically once daily.  Dispense: 60 g; Refill: 11           Follow up in about 6 months (around 11/29/2025) for follow up.     This note was generated with the assistance of ambient listening technology. Verbal consent was obtained by the patient and accompanying visitor(s) for the recording of patient appointment to facilitate this note. I attest to having reviewed and edited the generated note for accuracy, though some syntax or spelling errors may persist. Please contact the author of this note for any clarification.            [1]   Current Outpatient  Medications on File Prior to Visit   Medication Sig Dispense Refill    ascorbic acid, vitamin C, (VITAMIN C) 1000 MG tablet Take 1,000 mg by mouth once daily.      [DISCONTINUED] fenofibrate (TRICOR) 145 MG tablet Take 1 tablet (145 mg total) by mouth once daily. 90 tablet 3     No current facility-administered medications on file prior to visit.   [2]   Social History  Socioeconomic History    Marital status: Single   Tobacco Use    Smoking status: Never     Passive exposure: Never    Smokeless tobacco: Never   Substance and Sexual Activity    Alcohol use: Yes     Comment: socially    Drug use: Never    Sexual activity: Not Currently     Social Drivers of Health     Financial Resource Strain: Low Risk  (5/8/2023)    Overall Financial Resource Strain (CARDIA)     Difficulty of Paying Living Expenses: Not hard at all   Food Insecurity: Patient Declined (5/8/2023)    Hunger Vital Sign     Worried About Running Out of Food in the Last Year: Patient declined     Ran Out of Food in the Last Year: Patient declined   Transportation Needs: Patient Declined (5/8/2023)    PRAPARE - Transportation     Lack of Transportation (Medical): Patient declined     Lack of Transportation (Non-Medical): Patient declined   Physical Activity: Inactive (5/8/2023)    Exercise Vital Sign     Days of Exercise per Week: 0 days     Minutes of Exercise per Session: 0 min   Stress: Patient Declined (5/8/2023)    [employer] [place] [employer] - Occupational Stress Questionnaire     Feeling of Stress : Patient declined   Housing Stability: Low Risk  (5/8/2023)    Housing Stability Vital Sign     Unable to Pay for Housing in the Last Year: No     Number of Places Lived in the Last Year: 1     Unstable Housing in the Last Year: No

## 2025-05-29 NOTE — ASSESSMENT & PLAN NOTE
- Explained risks associated with high triglycerides, emphasizing potential for arterial clogging.  - Restarted fenofibrate to address elevated levels.  - Advised patient to avoid fried foods as part of dietary management.  - Ordered follow-up labs in 6 months to recheck triglyceride levels.